# Patient Record
Sex: FEMALE | Race: WHITE | NOT HISPANIC OR LATINO | Employment: FULL TIME | URBAN - METROPOLITAN AREA
[De-identification: names, ages, dates, MRNs, and addresses within clinical notes are randomized per-mention and may not be internally consistent; named-entity substitution may affect disease eponyms.]

---

## 2019-11-12 ENCOUNTER — HOSPITAL ENCOUNTER (EMERGENCY)
Facility: OTHER | Age: 29
Discharge: HOME | End: 2019-11-12
Attending: EMERGENCY MEDICINE
Payer: COMMERCIAL

## 2019-11-12 VITALS
HEART RATE: 81 BPM | OXYGEN SATURATION: 100 % | SYSTOLIC BLOOD PRESSURE: 127 MMHG | TEMPERATURE: 99 F | DIASTOLIC BLOOD PRESSURE: 75 MMHG | WEIGHT: 200 LBS | BODY MASS INDEX: 44.99 KG/M2 | HEIGHT: 56 IN | RESPIRATION RATE: 18 BRPM

## 2019-11-12 DIAGNOSIS — T14.90XA TRAUMA: ICD-10-CM

## 2019-11-12 DIAGNOSIS — S93.601A RIGHT FOOT SPRAIN, INITIAL ENCOUNTER: ICD-10-CM

## 2019-11-12 DIAGNOSIS — M25.571 ACUTE RIGHT ANKLE PAIN: Primary | ICD-10-CM

## 2019-11-12 PROCEDURE — 25000003 PHARM REV CODE 250: Performed by: EMERGENCY MEDICINE

## 2019-11-12 PROCEDURE — 99283 EMERGENCY DEPT VISIT LOW MDM: CPT | Mod: 25

## 2019-11-12 RX ORDER — ETODOLAC 400 MG/1
400 TABLET, FILM COATED ORAL 2 TIMES DAILY PRN
Qty: 20 TABLET | Refills: 0 | Status: SHIPPED | OUTPATIENT
Start: 2019-11-12

## 2019-11-12 RX ORDER — KETOROLAC TROMETHAMINE 10 MG/1
10 TABLET, FILM COATED ORAL
Status: COMPLETED | OUTPATIENT
Start: 2019-11-12 | End: 2019-11-12

## 2019-11-12 RX ADMIN — KETOROLAC TROMETHAMINE 10 MG: 10 TABLET, FILM COATED ORAL at 10:11

## 2019-11-13 NOTE — ED PROVIDER NOTES
Encounter Date: 11/12/2019    SCRIBE #1 NOTE: I, Himanshu Thrasher, am scribing for, and in the presence of, Dr. Singh.       History     Chief Complaint   Patient presents with    Ankle Pain     c/o right ankle pain after trip and fall      Time seen by provider: 8:53 PM    This is a 28 y.o. female who presents with complaint of right ankle pain after tripping on a curb PTA. The patient reports she was unable to bear weight on her right ankle following the fall. She denies pain anywhere else. She denies major medical problems. She reports drug allergies to penicillin and amoxicillin. The patient reports her last menstrual period was two weeks ago.    The history is provided by the patient.     Review of patient's allergies indicates:   Allergen Reactions    Amoxicillin     Penicillins      No past medical history on file.  No past surgical history on file.  No family history on file.  Social History     Tobacco Use    Smoking status: Not on file   Substance Use Topics    Alcohol use: Not on file    Drug use: Not on file     Review of Systems   Constitutional: Negative for fever.   HENT: Negative for sore throat.    Respiratory: Negative for shortness of breath.    Cardiovascular: Negative for chest pain.   Gastrointestinal: Negative for nausea.   Genitourinary: Negative for dysuria.   Musculoskeletal: Positive for arthralgias (Right ankle pain.).   Skin: Negative for rash.   Neurological: Negative for weakness.   Hematological: Does not bruise/bleed easily.   All other systems reviewed and are negative.      Physical Exam     Initial Vitals [11/12/19 2032]   BP Pulse Resp Temp SpO2   121/80 81 18 98.6 °F (37 °C) 95 %      MAP       --         Physical Exam    Nursing note and vitals reviewed.  Constitutional: She appears well-developed and well-nourished. She is not diaphoretic. No distress.   HENT:   Head: Normocephalic and atraumatic.   Right Ear: External ear normal.   Left Ear: External ear normal.    Nose: Nose normal.   Mouth/Throat: No oropharyngeal exudate.   Eyes: Conjunctivae and EOM are normal. Pupils are equal, round, and reactive to light. No scleral icterus.   Neck: Normal range of motion. Neck supple. No JVD present.   Cardiovascular: Normal rate, regular rhythm, normal heart sounds and intact distal pulses. Exam reveals no gallop and no friction rub.    No murmur heard.  Pulmonary/Chest: Breath sounds normal. No respiratory distress. She has no wheezes. She has no rhonchi. She has no rales. She exhibits no tenderness.   Musculoskeletal: Normal range of motion.   Right ankle:  Edema and tenderness at lateral malleolus and base of fifth metatarsal, no ankle instability, neurovascularly intact distally.   Neurological: She is alert and oriented to person, place, and time. She has normal strength. GCS score is 15. GCS eye subscore is 4. GCS verbal subscore is 5. GCS motor subscore is 6.   Sensations intact. Neurovascularly intact.   Skin: Skin is warm and dry. Capillary refill takes less than 2 seconds.   Psychiatric: She has a normal mood and affect. Thought content normal.         ED Course   Procedures  Labs Reviewed - No data to display       Imaging Results          X-Ray Foot Complete Right (Final result)  Result time 11/12/19 21:34:38    Final result by Nelli Ortiz MD (11/12/19 21:34:38)                 Impression:      No fracture or malalignment.      Electronically signed by: Nelli Ortiz  Date:    11/12/2019  Time:    21:34             Narrative:    EXAMINATION:  XR FOOT COMPLETE 3 VIEW RIGHT    CLINICAL HISTORY:  . Injury, unspecified, initial encounter    TECHNIQUE:  AP, lateral, and oblique views of the right foot were performed.    COMPARISON:  None    FINDINGS:  Frontal, oblique and lateral views.  Mineralization alignment joint spaces are normal.  No fracture or erosion or periosteal reaction.  No ankle effusion.  The Achilles tendon appears normal.  No coalition.  There is a  punctate calcification proximal medial to the navicular perhaps calcification of the posterior tibialis tendon or a tiny os navicularis, likely incidental finding.  No focal soft tissue swelling.                               X-Ray Ankle Complete Right (Final result)  Result time 11/12/19 21:33:29    Final result by Nelli Ortiz MD (11/12/19 21:33:29)                 Impression:      No fracture or malalignment.      Electronically signed by: Nelli Ortiz  Date:    11/12/2019  Time:    21:33             Narrative:    EXAMINATION:  XR ANKLE COMPLETE 3 VIEW RIGHT    CLINICAL HISTORY:  Injury, unspecified, initial encounter    TECHNIQUE:  AP, lateral, and oblique images of the right ankle were performed.    COMPARISON:  None    FINDINGS:  Frontal, oblique and lateral views.    Ankle mortise and talar dome appear intact.  No soft tissue swelling or effusion.  The mineralization alignment and joint spaces are normal without fracture or erosion or periosteal reaction.  The subtalar joints appear normal.  No coalition.  The Achilles tendon appears normal.  Midfoot alignment appears preserved.                              X-Rays:   Independently Interpreted Readings:   Other Readings:  Right Foot - Bony island noted at the medial mid foot as well as the lateral mid foot. No dislocation. No foreign bodies.  No fracture    Right Ankle - Edema at lateral malleolus. No fracture. No dislocation.    Medical Decision Making:   History:   Old Medical Records: I decided to obtain old medical records.  Differential Diagnosis:   Ankle fracture, foot fracture, dislocation or bone of ankle or foot, ligamentous injury, tenderness injury, neurovascular injury, compartment syndrome  Independently Interpreted Test(s):   I have ordered and independently interpreted X-rays - see prior notes.  Clinical Tests:   Radiological Study: Ordered and Reviewed  ED Management:  Patient improved with treatment in the emergency department and is  comfortable going home.  Educated the patient on warning signs and symptoms for which they must seek immediate medical attention.  I emphasized the importance of followup.  Patient understands that the emergency visit today is primarily to address immediate concerns and to rule out emergent cause of symptoms and that they may require further workup and evaluation as an outpatient. All questions addressed and patient given discharge instructions and followup information.               Scribe Attestation:   Scribe #1: I performed the above scribed service and the documentation accurately describes the services I performed. I attest to the accuracy of the note.    Attending Attestation:           Physician Attestation for Scribe:  Physician Attestation Statement for Scribe #1: I, Dr. Singh, reviewed documentation, as scribed by Himanshu Thrasher in my presence, and it is both accurate and complete.                               Clinical Impression:     1. Acute right ankle pain    2. Trauma    3. Right foot sprain, initial encounter                                Gabe Singh MD  11/12/19 [id number]